# Patient Record
Sex: MALE | Race: ASIAN | URBAN - METROPOLITAN AREA
[De-identification: names, ages, dates, MRNs, and addresses within clinical notes are randomized per-mention and may not be internally consistent; named-entity substitution may affect disease eponyms.]

---

## 2021-12-09 ENCOUNTER — OFFICE VISIT (OUTPATIENT)
Dept: FAMILY MEDICINE CLINIC | Facility: CLINIC | Age: 41
End: 2021-12-09
Payer: COMMERCIAL

## 2021-12-09 VITALS
HEIGHT: 68.23 IN | WEIGHT: 169.5 LBS | SYSTOLIC BLOOD PRESSURE: 146 MMHG | DIASTOLIC BLOOD PRESSURE: 94 MMHG | RESPIRATION RATE: 16 BRPM | TEMPERATURE: 98 F | OXYGEN SATURATION: 100 % | BODY MASS INDEX: 25.69 KG/M2 | HEART RATE: 78 BPM

## 2021-12-09 DIAGNOSIS — I10 ESSENTIAL HYPERTENSION: Primary | ICD-10-CM

## 2021-12-09 DIAGNOSIS — Z23 NEED FOR VACCINATION: ICD-10-CM

## 2021-12-09 PROCEDURE — 3080F DIAST BP >= 90 MM HG: CPT | Performed by: FAMILY MEDICINE

## 2021-12-09 PROCEDURE — 3077F SYST BP >= 140 MM HG: CPT | Performed by: FAMILY MEDICINE

## 2021-12-09 PROCEDURE — 3008F BODY MASS INDEX DOCD: CPT | Performed by: FAMILY MEDICINE

## 2021-12-09 PROCEDURE — 99203 OFFICE O/P NEW LOW 30 MIN: CPT | Performed by: FAMILY MEDICINE

## 2021-12-09 RX ORDER — LISINOPRIL 5 MG/1
5 TABLET ORAL DAILY
Qty: 30 TABLET | Refills: 1 | Status: SHIPPED | OUTPATIENT
Start: 2021-12-09 | End: 2022-02-03

## 2021-12-09 NOTE — PROGRESS NOTES
Subjective:   Patient ID: Ava Viramontes is a 39year old male. HPI   37yr old male presents with wife, Lyndsay Nur as a new patient and to f/u on his HTN.   States he went to donate blood a few weeks ago and was found to have high blood pressure and was normal.         Behavior: Behavior normal.         Assessment & Plan:   Essential hypertension  (primary encounter diagnosis)  Need for vaccination  - bp elevated on multiple readings today, mine was 150/96  - emphasized importance of bp control and compli

## 2021-12-09 NOTE — PATIENT INSTRUCTIONS
Controlling High Blood Pressure   High blood pressure (hypertension) is often called the silent killer. This is because many people who have it, don’t know it. It can be very dangerous.  High blood pressure can raise your risk of heart attack, stroke, hea that your meal be prepared with no added salt. Stay at a healthy weight   · Ask your healthcare provider how many calories to eat a day. Then stick to that number. · Ask your healthcare provider what weight range is healthiest for you.  If you are overw information is not intended as a substitute for professional medical care. Always follow your healthcare professional's instructions.         Discharge Instructions for High Blood Pressure (Hypertension)  You have been diagnosed with high blood pressure (hy warnings about high blood pressure on the label. Ask the pharmacist before purchasing something you haven't used before  · Check with your doctor or pharmacist before taking a decongestant such as pseudoephedrine or phenylephrine.  Some decongestants can wo for women and 2 drinks a day for men. Follow-up care  Make a follow-up appointment as directed.   When to call your healthcare provider  Call your healthcare provider right away if you have any of the following:  · Chest pain or shortness of breath ( alexandru

## 2021-12-23 ENCOUNTER — OFFICE VISIT (OUTPATIENT)
Dept: FAMILY MEDICINE CLINIC | Facility: CLINIC | Age: 41
End: 2021-12-23
Payer: COMMERCIAL

## 2021-12-23 VITALS
BODY MASS INDEX: 25.46 KG/M2 | RESPIRATION RATE: 16 BRPM | HEART RATE: 98 BPM | OXYGEN SATURATION: 99 % | SYSTOLIC BLOOD PRESSURE: 126 MMHG | TEMPERATURE: 98 F | DIASTOLIC BLOOD PRESSURE: 80 MMHG | WEIGHT: 168 LBS | HEIGHT: 68.25 IN

## 2021-12-23 DIAGNOSIS — I10 ESSENTIAL HYPERTENSION: ICD-10-CM

## 2021-12-23 DIAGNOSIS — E78.2 MIXED HYPERLIPIDEMIA: ICD-10-CM

## 2021-12-23 DIAGNOSIS — Z00.00 ENCOUNTER FOR ANNUAL PHYSICAL EXAM: Primary | ICD-10-CM

## 2021-12-23 PROCEDURE — 3079F DIAST BP 80-89 MM HG: CPT | Performed by: FAMILY MEDICINE

## 2021-12-23 PROCEDURE — 3074F SYST BP LT 130 MM HG: CPT | Performed by: FAMILY MEDICINE

## 2021-12-23 PROCEDURE — 99214 OFFICE O/P EST MOD 30 MIN: CPT | Performed by: FAMILY MEDICINE

## 2021-12-23 PROCEDURE — 3008F BODY MASS INDEX DOCD: CPT | Performed by: FAMILY MEDICINE

## 2021-12-23 PROCEDURE — 99396 PREV VISIT EST AGE 40-64: CPT | Performed by: FAMILY MEDICINE

## 2021-12-23 RX ORDER — ROSUVASTATIN CALCIUM 10 MG/1
10 TABLET, COATED ORAL NIGHTLY
Qty: 30 TABLET | Refills: 3 | Status: SHIPPED | OUTPATIENT
Start: 2021-12-23

## 2021-12-24 NOTE — PROGRESS NOTES
/80   Pulse 98   Temp 97.7 °F (36.5 °C) (Temporal)   Resp 16   Ht 5' 8.25\" (1.734 m)   Wt 168 lb (76.2 kg)   SpO2 99%   BMI 25.36 kg/m²  Body mass index is 25.36 kg/m².      Patient presents with:  Hypertension: 2 week f/u      Moises Viramontes throat; hearing loss negative  RESPIRATORY: denies shortness of breath, wheezing or cough  CARDIOVASCULAR: denies chest pain or FLORES; no palpitations  GI: denies nausea, vomiting, constipation, diarrhea; no rectal bleeding; no heartburn  :  (Male):melodie (14)  -     LIPID PANEL REFLEX TO DLDL    Essential hypertension  Low-salt diet  Exercise  Compliance with medications  Continue lisinopril  Other orders  -     rosuvastatin 10 MG Oral Tab; Take 1 tablet (10 mg total) by mouth nightly.       JOHN Turk

## 2022-01-20 RX ORDER — ROSUVASTATIN CALCIUM 10 MG/1
10 TABLET, COATED ORAL NIGHTLY
Qty: 30 TABLET | Refills: 3 | OUTPATIENT
Start: 2022-01-20

## 2022-02-03 RX ORDER — LISINOPRIL 5 MG/1
5 TABLET ORAL DAILY
Qty: 90 TABLET | Refills: 0 | Status: SHIPPED | OUTPATIENT
Start: 2022-02-03

## 2022-02-03 NOTE — TELEPHONE ENCOUNTER
LOV 12/9/2021      LAST LAB 12/11/2021    LAST RX  lisinopril 5 MG Oral Tab 30 tablet 1 12/9/2021         Next OV   Future Appointments   Date Time Provider Estelle Navarrete   2/21/2022  5:30 PM Tiffany Garsia MD [facility]         PROTOCOL passed

## 2022-02-18 LAB
ALBUMIN/GLOBULIN RATIO: 1.6 (CALC) (ref 1–2.5)
ALBUMIN: 4.4 G/DL (ref 3.6–5.1)
ALKALINE PHOSPHATASE: 151 U/L (ref 36–130)
ALT: 134 U/L (ref 9–46)
AST: 85 U/L (ref 10–40)
BUN: 11 MG/DL (ref 7–25)
CALCIUM: 9.2 MG/DL (ref 8.6–10.3)
CARBON DIOXIDE: 29 MMOL/L (ref 20–32)
CHLORIDE: 103 MMOL/L (ref 98–110)
CHOL/HDLC RATIO: 2.7 (CALC)
CHOLESTEROL, TOTAL: 94 MG/DL
CREATININE: 1.11 MG/DL (ref 0.6–1.35)
EGFR IF AFRICN AM: 94 ML/MIN/1.73M2
EGFR IF NONAFRICN AM: 81 ML/MIN/1.73M2
GLOBULIN: 2.8 G/DL (CALC) (ref 1.9–3.7)
GLUCOSE: 95 MG/DL (ref 65–99)
HDL CHOLESTEROL: 35 MG/DL
LDL-CHOLESTEROL: 44 MG/DL (CALC)
NON-HDL CHOLESTEROL: 59 MG/DL (CALC)
POTASSIUM: 4 MMOL/L (ref 3.5–5.3)
PROTEIN, TOTAL: 7.2 G/DL (ref 6.1–8.1)
SODIUM: 138 MMOL/L (ref 135–146)
TRIGLYCERIDES: 73 MG/DL

## 2022-02-21 ENCOUNTER — OFFICE VISIT (OUTPATIENT)
Dept: FAMILY MEDICINE CLINIC | Facility: CLINIC | Age: 42
End: 2022-02-21
Payer: COMMERCIAL

## 2022-02-21 VITALS
TEMPERATURE: 97 F | BODY MASS INDEX: 25.01 KG/M2 | RESPIRATION RATE: 16 BRPM | DIASTOLIC BLOOD PRESSURE: 80 MMHG | OXYGEN SATURATION: 100 % | HEART RATE: 84 BPM | SYSTOLIC BLOOD PRESSURE: 120 MMHG | WEIGHT: 165 LBS | HEIGHT: 68.25 IN

## 2022-02-21 DIAGNOSIS — E78.2 MIXED HYPERLIPIDEMIA: Primary | ICD-10-CM

## 2022-02-21 DIAGNOSIS — R74.8 ELEVATED LIVER ENZYMES: ICD-10-CM

## 2022-02-21 DIAGNOSIS — J30.9 ALLERGIC RHINITIS, UNSPECIFIED SEASONALITY, UNSPECIFIED TRIGGER: ICD-10-CM

## 2022-02-21 PROCEDURE — 3008F BODY MASS INDEX DOCD: CPT | Performed by: FAMILY MEDICINE

## 2022-02-21 PROCEDURE — 3079F DIAST BP 80-89 MM HG: CPT | Performed by: FAMILY MEDICINE

## 2022-02-21 PROCEDURE — 99214 OFFICE O/P EST MOD 30 MIN: CPT | Performed by: FAMILY MEDICINE

## 2022-02-21 PROCEDURE — 3074F SYST BP LT 130 MM HG: CPT | Performed by: FAMILY MEDICINE

## 2022-02-21 RX ORDER — LORATADINE 10 MG/1
10 TABLET ORAL DAILY
Qty: 30 TABLET | Refills: 2 | Status: SHIPPED | OUTPATIENT
Start: 2022-02-21

## 2022-02-21 RX ORDER — IPRATROPIUM BROMIDE 21 UG/1
2 SPRAY, METERED NASAL EVERY 12 HOURS
Qty: 1 EACH | Refills: 2 | Status: SHIPPED | OUTPATIENT
Start: 2022-02-21

## 2022-04-01 LAB
ALBUMIN/GLOBULIN RATIO: 1.6 (CALC) (ref 1–2.5)
ALBUMIN: 4.4 G/DL (ref 3.6–5.1)
ALKALINE PHOSPHATASE: 135 U/L (ref 36–130)
ALT: 37 U/L (ref 9–46)
AST: 32 U/L (ref 10–40)
BILIRUBIN, TOTAL: 0.6 MG/DL (ref 0.2–1.2)
BUN: 17 MG/DL (ref 7–25)
CALCIUM: 9.2 MG/DL (ref 8.6–10.3)
CARBON DIOXIDE: 29 MMOL/L (ref 20–32)
CHLORIDE: 103 MMOL/L (ref 98–110)
CHOL/HDLC RATIO: 5.4 (CALC)
CHOLESTEROL, TOTAL: 196 MG/DL
CREATININE: 1.06 MG/DL (ref 0.6–1.35)
EGFR IF AFRICN AM: 100 ML/MIN/1.73M2
EGFR IF NONAFRICN AM: 86 ML/MIN/1.73M2
GLOBULIN: 2.7 G/DL (CALC) (ref 1.9–3.7)
GLUCOSE: 88 MG/DL (ref 65–99)
HDL CHOLESTEROL: 36 MG/DL
LDL-CHOLESTEROL: 137 MG/DL (CALC)
NON-HDL CHOLESTEROL: 160 MG/DL (CALC)
POTASSIUM: 4.3 MMOL/L (ref 3.5–5.3)
PROTEIN, TOTAL: 7.1 G/DL (ref 6.1–8.1)
SODIUM: 138 MMOL/L (ref 135–146)
TRIGLYCERIDES: 114 MG/DL

## 2022-04-04 ENCOUNTER — OFFICE VISIT (OUTPATIENT)
Dept: FAMILY MEDICINE CLINIC | Facility: CLINIC | Age: 42
End: 2022-04-04
Payer: COMMERCIAL

## 2022-04-04 VITALS
BODY MASS INDEX: 25.1 KG/M2 | DIASTOLIC BLOOD PRESSURE: 68 MMHG | HEART RATE: 67 BPM | HEIGHT: 68.11 IN | RESPIRATION RATE: 16 BRPM | OXYGEN SATURATION: 100 % | WEIGHT: 165.63 LBS | SYSTOLIC BLOOD PRESSURE: 122 MMHG | TEMPERATURE: 96 F

## 2022-04-04 DIAGNOSIS — E78.2 MIXED HYPERLIPIDEMIA: Primary | ICD-10-CM

## 2022-04-04 DIAGNOSIS — R74.8 ELEVATED LIVER ENZYMES: ICD-10-CM

## 2022-04-04 PROCEDURE — 3008F BODY MASS INDEX DOCD: CPT | Performed by: FAMILY MEDICINE

## 2022-04-04 PROCEDURE — 99213 OFFICE O/P EST LOW 20 MIN: CPT | Performed by: FAMILY MEDICINE

## 2022-04-04 PROCEDURE — 3078F DIAST BP <80 MM HG: CPT | Performed by: FAMILY MEDICINE

## 2022-04-04 PROCEDURE — 3074F SYST BP LT 130 MM HG: CPT | Performed by: FAMILY MEDICINE

## 2022-04-04 RX ORDER — PRAVASTATIN SODIUM 20 MG
20 TABLET ORAL NIGHTLY
Qty: 30 TABLET | Refills: 3 | Status: SHIPPED | OUTPATIENT
Start: 2022-04-04

## 2022-05-17 RX ORDER — LISINOPRIL 5 MG/1
5 TABLET ORAL DAILY
Qty: 90 TABLET | Refills: 0 | Status: SHIPPED | OUTPATIENT
Start: 2022-05-17

## 2022-05-17 NOTE — TELEPHONE ENCOUNTER
Hypertension Medications Protocol Passed 05/17/2022 12:17 AM   Protocol Details  CMP or BMP in past 12 months    Last serum creatinine< 2.0    Appointment in past 6 or next 3 months        LOV 4/4/22     LAST LAB  4/1/22     LAST RX 2/3/22 90 tabs     Next OV   Future Appointments   Date Time Provider Estelle Rosalba   6/6/2022  6:00 PM Abdullahi Hdez MD [facility]         PROTOCOL pass

## 2022-06-04 LAB
ALBUMIN/GLOBULIN RATIO: 1.6 (CALC) (ref 1–2.5)
ALBUMIN: 4.4 G/DL (ref 3.6–5.1)
ALKALINE PHOSPHATASE: 89 U/L (ref 36–130)
ALT: 24 U/L (ref 9–46)
AST: 27 U/L (ref 10–40)
BILIRUBIN, TOTAL: 0.4 MG/DL (ref 0.2–1.2)
BUN: 14 MG/DL (ref 7–25)
CALCIUM: 9.1 MG/DL (ref 8.6–10.3)
CARBON DIOXIDE: 28 MMOL/L (ref 20–32)
CHLORIDE: 102 MMOL/L (ref 98–110)
CHOL/HDLC RATIO: 3.2 (CALC)
CHOLESTEROL, TOTAL: 128 MG/DL
CREATININE: 1.17 MG/DL (ref 0.6–1.35)
EGFR IF AFRICN AM: 89 ML/MIN/1.73M2
EGFR IF NONAFRICN AM: 76 ML/MIN/1.73M2
GLOBULIN: 2.7 G/DL (CALC) (ref 1.9–3.7)
GLUCOSE: 88 MG/DL (ref 65–99)
HDL CHOLESTEROL: 40 MG/DL
LDL-CHOLESTEROL: 72 MG/DL (CALC)
NON-HDL CHOLESTEROL: 88 MG/DL (CALC)
POTASSIUM: 3.8 MMOL/L (ref 3.5–5.3)
PROTEIN, TOTAL: 7.1 G/DL (ref 6.1–8.1)
SODIUM: 137 MMOL/L (ref 135–146)
TRIGLYCERIDES: 84 MG/DL

## 2022-06-06 ENCOUNTER — TELEMEDICINE (OUTPATIENT)
Dept: FAMILY MEDICINE CLINIC | Facility: CLINIC | Age: 42
End: 2022-06-06
Payer: COMMERCIAL

## 2022-06-06 DIAGNOSIS — E78.2 MIXED HYPERLIPIDEMIA: Primary | ICD-10-CM

## 2022-06-06 RX ORDER — PRAVASTATIN SODIUM 20 MG
20 TABLET ORAL NIGHTLY
Qty: 90 TABLET | Refills: 1 | Status: SHIPPED | OUTPATIENT
Start: 2022-06-06

## 2022-08-17 RX ORDER — LISINOPRIL 5 MG/1
5 TABLET ORAL DAILY
Qty: 30 TABLET | Refills: 2 | Status: SHIPPED | OUTPATIENT
Start: 2022-08-17

## 2022-12-17 LAB
ALBUMIN/GLOBULIN RATIO: 1.6 (CALC) (ref 1–2.5)
ALBUMIN: 4.5 G/DL (ref 3.6–5.1)
ALKALINE PHOSPHATASE: 124 U/L (ref 36–130)
ALT: 38 U/L (ref 9–46)
AST: 38 U/L (ref 10–40)
BILIRUBIN, TOTAL: 0.5 MG/DL (ref 0.2–1.2)
BUN: 17 MG/DL (ref 7–25)
CALCIUM: 9.2 MG/DL (ref 8.6–10.3)
CARBON DIOXIDE: 29 MMOL/L (ref 20–32)
CHLORIDE: 104 MMOL/L (ref 98–110)
CHOL/HDLC RATIO: 4.1 (CALC)
CHOLESTEROL, TOTAL: 158 MG/DL
CREATININE: 1.25 MG/DL (ref 0.6–1.29)
EGFR: 74 ML/MIN/1.73M2
GLOBULIN: 2.8 G/DL (CALC) (ref 1.9–3.7)
GLUCOSE: 85 MG/DL (ref 65–99)
HDL CHOLESTEROL: 39 MG/DL
LDL-CHOLESTEROL: 104 MG/DL (CALC)
NON-HDL CHOLESTEROL: 119 MG/DL (CALC)
POTASSIUM: 4.4 MMOL/L (ref 3.5–5.3)
PROTEIN, TOTAL: 7.3 G/DL (ref 6.1–8.1)
SODIUM: 138 MMOL/L (ref 135–146)
TRIGLYCERIDES: 66 MG/DL

## 2023-09-25 NOTE — TELEPHONE ENCOUNTER
LOV 12/19/2022      LAST LAB 12/16/22    LAST RX  pravastatin (PRAVACHOL) 20 MG Oral Tab 90 tablet 1 12/19/2022       Next OV No future appointments.       PROTOCOL pass

## 2024-02-20 NOTE — PROGRESS NOTES
/64   Pulse 89   Temp 97.3 °F (36.3 °C) (Temporal)   Resp 16   Ht 5' 8.1\" (1.73 m)   Wt 171 lb (77.6 kg)   SpO2 99%   BMI 25.92 kg/m²  Body mass index is 25.92 kg/m².     Chief Complaint   Patient presents with    Physical       Shialejandra Sanz is a 44 year old male who presents for a complete physical exam.   HPI:   Otherwise healthy male with history of hypertension and hyperlipidemia who is here today for his annual physical  He had his blood test for physical done  CBC his within normal limits except for his hemoglobin was borderline low at 13 with MCV normal at 81  Patient states that he donates blood and he used to do it every month  He was told that he is anemic  This happened few times  Blood test revealed that his iron levels are normal although on the lower side and his TIBC is high  Patient denies any internal or external source of bleeding, denies any melena or hematuria    His other labs were in acceptable limits  Patient is currently taking pravastatin and lisinopril    Wt Readings from Last 4 Encounters:   02/19/24 171 lb (77.6 kg)   10/28/23 168 lb (76.2 kg)   12/19/22 166 lb (75.3 kg)   04/04/22 165 lb 9.6 oz (75.1 kg)     Body mass index is 25.92 kg/m².   BP Readings from Last 3 Encounters:   02/19/24 120/64   10/28/23 106/60   12/19/22 120/64      Current Outpatient Medications   Medication Sig Dispense Refill    Ferrous Sulfate 325 (65 Fe) MG Oral Tab Take 1 tablet (325 mg total) by mouth daily with breakfast. 90 tablet 1    pravastatin 20 MG Oral Tab Take 1 tablet (20 mg total) by mouth nightly. 90 tablet 2    lisinopril 5 MG Oral Tab Take 1 tablet (5 mg total) by mouth daily. 90 tablet 2      No past medical history on file.   No past surgical history on file.   Family History   Problem Relation Age of Onset    Hypertension Father     Heart Disorder Father     Dementia Mother       Social History:  Social History     Socioeconomic History    Marital status:    Tobacco  Use    Smoking status: Never    Smokeless tobacco: Never   Substance and Sexual Activity    Alcohol use: Never    Drug use: Never      Exercise: none.  Diet: doesn't watch     REVIEW OF SYSTEMS:   GENERAL HEALTH: feels well otherwise, denies fever  SKIN: denies any unusual skin lesions or rashes  EYES: no visual complaints or deficits  HEENT: denies nasal congestion, sinus pain or sore throat; hearing loss negative  RESPIRATORY: denies shortness of breath, wheezing or cough  CARDIOVASCULAR: denies chest pain or FLORES; no palpitations  GI: denies nausea, vomiting, constipation, diarrhea; no rectal bleeding; no heartburn  :  (Male):denies nocturia or changes in stream  MUSCULOSKELETAL: no joint complaints upper or lower extremities  NEURO: no sensory or motor complaint  PSYCHE: no symptoms of depression or anxiety  HEMATOLOGY: denies h/o anemia; denies bruising or excessive bleeding  ENDOCRINE: denies excessive thirst or urination; denies unexpected wt gain or wt loss  ALLERGY/IMM.: denies food or seasonal allergies    EXAM:   /64   Pulse 89   Temp 97.3 °F (36.3 °C) (Temporal)   Resp 16   Ht 5' 8.1\" (1.73 m)   Wt 171 lb (77.6 kg)   SpO2 99%   BMI 25.92 kg/m²  Body mass index is 25.92 kg/m².   GENERAL: well developed, well nourished,in no apparent distress  SKIN: no rashes,no suspicious lesions  HEENT: atraumatic, normocephalic,ears and throat are clear  EYES:PERRLA, EOMI,conjunctiva are clear  NECK: supple,no adenopathy,no bruits  CHEST: no chest tenderness  LUNGS: clear to auscultation  CARDIO: RRR without murmur  GI: good BS's,no masses, HSM or tenderness  : Deferred  RECTAL:Deferred  MUSCULOSKELETAL: back is not tender,FROM of the back  EXTREMITIES: no cyanosis, clubbing or edema  NEURO: Oriented times three,cranial nerves are intact,motor and sensory are grossly intact    ASSESSMENT AND PLAN:   :Moises was seen today for physical.    Diagnoses and all orders for this visit:    Encounter for annual  physical examSnarciso Sanz is a 44 year old male who presents for a complete physical exam.   Pt's weight is Body mass index is 25.92 kg/m².,   Eat a heart-healthy diet. Include potassium and fiber, and drink plenty of water.    Exercise regularly --- Dietary measures discussed include diet about 1800 calories - Excercise regimen also reviewed as well as long term benefits on overall health.   Recommend at least 30 minutes a day 3-4 times a week of aerobic activity such as brisk walking, cycling, aerobics, or swimming.  Anerobic activities also encouraged for overall toning and strength/endurance building  Fasting labs reviewed with the patient  Immunizations reviewed  Diet and exercise counseling given  Depression screen completed  Anemia, unspecified type  Previous labs reviewed  Patient's last hemoglobin A1c in 2021 was 15 with normal MCV-   His energy level is down  My plan at this time is to get stool for Hemoccult blood  I am starting him on ferrous sulfate once a day  We will recheck his CBC 3 months  If his numbers are still low I will send him to hematology    occult Blood, Fecal, Immunoassay (Blue cards) [E]    Other orders  -     Ferrous Sulfate 325 (65 Fe) MG Oral Tab; Take 1 tablet (325 mg total) by mouth daily with breakfast.        The patient indicates understanding of these issues and agrees to the plan.  .      No follow-ups on file.        Willi Joseph MD, 2/19/2024, 6:56 PM      This dictation was performed with a verbal recognition program (DRAGON) and it was checked for errors. It is possible that there are still dictated errors within this office note. If so, please bring any errors to my attention for an addendum. All efforts were made to ensure that this office note is accurate

## 2024-08-06 NOTE — TELEPHONE ENCOUNTER
Refill passed per [facility] protocol.  Requested Prescriptions   Pending Prescriptions Disp Refills    LISINOPRIL 5 MG Oral Tab [Pharmacy Med Name: LISINOPRIL 5 MG TABLET] 90 tablet 2     Sig: Take 1 tablet (5 mg total) by mouth daily.       Hypertension Medications Protocol Passed - 8/2/2024  2:02 PM        Passed - CMP or BMP in past 12 months        Passed - Last BP reading less than 140/90     BP Readings from Last 1 Encounters:   02/19/24 120/64               Passed - In person appointment or virtual visit in the past 12 mos or appointment in next 3 mos     Recent Outpatient Visits              5 months ago [facility]    Office Visit    9 months ago Essential hypertension    [facility]    Office Visit    1 year ago [facility] for annual physical exam    [facility]    Office Visit    2 years ago Mixed hyperlipidemia    [facility]    Telemedicine    2 years ago Mixed hyperlipidemia    [facility]    Office Visit                      Passed - EGFRCR or GFRNAA > 50     GFR Evaluation  EGFRCR: 70 , resulted on 2/17/2024             Recent Outpatient Visits              5 months ago Encounter for annual physical exam    [facility]    Office Visit    9 months ago Essential hypertension    [facility]    Office Visit    1 year ago Encounter for annual physical exam    [facility]    Office Visit    2 years ago Mixed hyperlipidemia    [facility]em  MD    Telemedicine    2 years ago Mixed hyperlipidemia    [facility], [address], Willi Garces MD    Office Visit

## 2024-12-23 NOTE — ED INITIAL ASSESSMENT (HPI)
Pt c/o pain and swelling to left ankle x5 days, rpt injuring it Wednesday night while playing volleyball, rpt jumping up and landing \"on the wrong foot\"; concerned because he continues to have pain and swelling

## 2024-12-23 NOTE — ED PROVIDER NOTES
Patient Seen in: [facility]      History     Chief Complaint   Patient presents with    Leg or Foot Injury     Stated Complaint: Ankle injury    Subjective:   This is a 44-year-old male with no significant past medical history.  Presents to immediate care for left ankle injury.  Reports he injured his ankle 5 days ago while playing volleyball.  Continues to have pain and swelling.  Worse when ambulating.  No treatment attempted prior to arrival.     The history is provided by the patient.             Objective:     History reviewed. No pertinent past medical history.           History reviewed. No pertinent surgical history.             Social History     Socioeconomic History    Marital status:    Tobacco Use    Smoking status: Never    Smokeless tobacco: Never   Substance and Sexual Activity    Alcohol use: Never    Drug use: Never              Review of Systems   Constitutional:  Negative for chills and fever.   HENT:  Negative for congestion and sore throat.    Respiratory:  Negative for cough.    Gastrointestinal:  Negative for abdominal pain.   Genitourinary:  Negative for dysuria.   Musculoskeletal:  Positive for arthralgias and joint swelling. Negative for back pain, myalgias, neck pain and neck stiffness.   Skin:  Negative for rash.   Neurological:  Negative for numbness.       Positive for stated complaint: Ankle injury  Other systems are as noted in HPI.  Constitutional and vital signs reviewed.      All other systems reviewed and negative except as noted above.    Physical Exam     ED Triage Vitals [12/23/24 1153]   /90   Pulse 90   Resp 18   Temp 97.7 °F (36.5 °C)   Temp src Oral   SpO2 98 %   O2 Device None (Room air)       Current Vitals:   Vital Signs  BP: 146/90  Pulse: 90  Resp: 18  Temp: 97.7 °F (36.5 °C)  Temp src: Oral    Oxygen Therapy  SpO2: 98 %  O2 Device: None (Room air)        Physical Exam  Vitals and nursing note reviewed.   Constitutional:        General: He is not in acute distress.     Appearance: Normal appearance. He is not ill-appearing, toxic-appearing or diaphoretic.   HENT:      Head: Normocephalic and atraumatic.      Right Ear: External ear normal.      Left Ear: External ear normal.      Nose: Nose normal.      Mouth/Throat:      Mouth: Mucous membranes are moist.      Pharynx: Oropharynx is clear.   Eyes:      General:         Right eye: No discharge.         Left eye: No discharge.      Extraocular Movements: Extraocular movements intact.      Conjunctiva/sclera: Conjunctivae normal.   Cardiovascular:      Rate and Rhythm: Normal rate.   Pulmonary:      Effort: Pulmonary effort is normal.   Musculoskeletal:      Cervical back: Neck supple.      Right lower leg: No edema.      Left lower leg: Normal. No edema.      Left ankle: Swelling present. No deformity, ecchymosis or lacerations. Tenderness present over the lateral malleolus. No medial malleolus or proximal fibula tenderness. Decreased range of motion. Anterior drawer test negative. Normal pulse.      Left Achilles Tendon: Normal.      Left foot: Normal.   Skin:     General: Skin is warm and dry.      Capillary Refill: Capillary refill takes less than 2 seconds.      Findings: No rash.   Neurological:      Mental Status: He is alert and oriented to person, place, and time.   Psychiatric:         Mood and Affect: Mood normal.         Behavior: Behavior normal.             ED Course   Labs Reviewed - No data to display         Xray left ankle       MDM      Vital signs stable.  Patient is well appearing and non toxic looking.  Presents to the IC for left ankle injury.     Differential diagnosis includes but is no limited too sprain, contusion, cellulitis, fracture, arthritis, tendonitis    The swelling and tenderness along the lateral malleolus.  No bruising or deformity.  Distal CMS intact.     Xrays films reviewed and interpreted by myself. Results show soft tissue swelling with no evidence  of acute fracture     Clinical impression is mild ankle sprain.    Patient was offered Ace wrap and crutches but refused.  Reports he has ankle support device at home.    Dc home. Rice instructions reviewed. Tylenol/ibuprofen for pain. Ortho follow up as needed.  Patient verbalized understanding and agreed with plan of care.  All questions were answered.              Medical Decision Making      Disposition and Plan     Clinical Impression:  1. Mild sprain of left ankle, initial encounter         Disposition:  Discharge  12/23/2024 12:46 pm    Follow-up:  Shu Sanchez, PA  [address] [place] [id number]  [phone]    In 1 week            Medications Prescribed:  Discharge Medication List as of 12/23/2024 12:55 PM              Supplementary Documentation:

## 2024-12-23 NOTE — DISCHARGE INSTRUCTIONS
Wear your ankle compression device while awake.  Rest, ice, and elevate.  Tylenol and ibuprofen.  Refrain from exercising and sports activities while having pain.  Orthopedic follow-up if needed.

## 2024-12-29 NOTE — TELEPHONE ENCOUNTER
Refill passed per [facility] protocol.    Requested Prescriptions   Pending Prescriptions Disp Refills    PRAVASTATIN 20 MG Oral Tab [Pharmacy Med Name: PRAVASTATIN SODIUM 20 MG TAB] 90 tablet 2     Sig: TAKE 1 TABLET BY MOUTH EVERY DAY AT NIGHT       Cholesterol Medication Protocol Passed - 12/29/2024 10:16 AM        Passed - ALT < 80     Lab Results   Component Value Date    ALT 43 02/17/2024             Passed - ALT resulted within past year        Passed - Lipid panel within past 12 months     Lab Results   Component Value Date    CHOLEST 150 02/17/2024    TRIG 81 02/17/2024    HDL 42 02/17/2024    LDL 91 02/17/2024    TCHDLRATIO 3.6 02/17/2024    NONHDLC 108 02/17/2024             Passed - In person appointment or virtual visit in the past 12 mos or appointment in next 3 mos     Recent Outpatient Visits              10 months ago Encounter for annual physical exam    [facility], [address] [place], [place] [address]Tiffani Kaleem, MD    Office Visit    1 year ago Essential hypertension    [facility] [address] [place], [place] [address]Tiffani Kaleem, MD    Office Visit    2 years ago Encounter for annual physical exam    [facility], [address] [place], [place] [address]Tiffani Kaleem, MD    Office Visit    2 years ago Mixed hyperlipidemia    [facility] [address] [place], [place] [address]Tiffani Kaleem, MD    Telemedicine    2 years ago Mixed hyperlipidemia    [facility], [address] [place], [place] [address]Tiffani Kaleem, MD    Office Visit                               Recent Outpatient Visits              10 months ago Encounter for annual physical exam    [facility] [address] [place], [place] [address]Tiffani Kaleem, MD    Office Visit    1 year ago Essential hypertension    [facility] [address] [place], [place] [address]Tiffani Kaleem, MD    Office Visit    2 years ago Encounter for annual physical exam    [facility] [address] [place], [place] [address]Tiffani  Willi Joseph MD    Office Visit    2 years ago Mixed hyperlipidemia    [facility], [address] [place] [address], Willi Garces MD    Telemedicine    2 years ago Mixed hyperlipidemia    [facility], [address] [place] [address], Willi Garces MD    Office Visit
